# Patient Record
Sex: MALE | Race: WHITE | NOT HISPANIC OR LATINO | URBAN - METROPOLITAN AREA
[De-identification: names, ages, dates, MRNs, and addresses within clinical notes are randomized per-mention and may not be internally consistent; named-entity substitution may affect disease eponyms.]

---

## 2021-05-30 ENCOUNTER — RECORDS - HEALTHEAST (OUTPATIENT)
Dept: ADMINISTRATIVE | Facility: CLINIC | Age: 36
End: 2021-05-30

## 2021-06-14 ENCOUNTER — HOSPITAL ENCOUNTER (EMERGENCY)
Dept: EMERGENCY MEDICINE | Facility: CLINIC | Age: 36
Discharge: HOME | End: 2021-06-14
Attending: STUDENT IN AN ORGANIZED HEALTH CARE EDUCATION/TRAINING PROGRAM

## 2021-06-14 DIAGNOSIS — R10.9 FLANK PAIN: ICD-10-CM

## 2021-06-14 LAB
ALBUMIN UR-MCNC: NEGATIVE G/DL
APPEARANCE UR: CLEAR
BACTERIA #/AREA URNS HPF: ABNORMAL /[HPF]
BILIRUB UR QL STRIP: NEGATIVE
COLOR UR AUTO: COLORLESS
GLUCOSE UR STRIP-MCNC: NEGATIVE MG/DL
HGB UR QL STRIP: ABNORMAL
KETONES UR STRIP-MCNC: NEGATIVE MG/DL
LEUKOCYTE ESTERASE UR QL STRIP: NEGATIVE
NITRATE UR QL: NEGATIVE
PH UR STRIP: 6 [PH] (ref 5–8)
RBC URINE: 10 HPF
SP GR UR STRIP: 1.01 (ref 1–1.03)
SQUAMOUS EPITHELIAL: 0 /HPF
UROBILINOGEN UR STRIP-ACNC: ABNORMAL
WBC URINE: <1 HPF

## 2021-06-25 NOTE — ED TRIAGE NOTES
Patient had sudden onset left flank and LLQ pain last night, now resolved. Had nausea when pain was severe and was diaphoretic, Pain resolved a couple hours ago when going to urgent care.

## 2021-06-26 ENCOUNTER — HEALTH MAINTENANCE LETTER (OUTPATIENT)
Age: 36
End: 2021-06-26

## 2021-06-26 NOTE — ED PROVIDER NOTES
EMERGENCY DEPARTMENT ENCOUNTER      NAME: Jeffrey Tanner  AGE: 35 y.o. male  YOB: 1985  MRN: [id number]  EVALUATION DATE & TIME: 2021  9:51 AM    PCP: Provider, No Primary Care    ED PROVIDER: Carline Ascencio M.D.    Chief Complaint   Patient presents with     Flank Pain       FINAL IMPRESSION:  1. Flank pain        ED COURSE & MEDICAL DECISION MAKIN y.o. old male who presents to the ED for evaluation of flank pain.   History and physical examination as documented. Vital signs reassuring.     Patient's symptoms have resolved since getting to the ED. Feels great and has no pain now. He had severe left sided flank pain. UA with findings of small amount of blood. No pyuria or signs to suggest UTI. Has no CVA or abdominal tenderness on exam. No indication for imaging. I think it's most likely that he had ureterolithiasis and passed the stone already. Given return precautions and discharged in stable condition.     Scribe time stamps  10:35 AM I saw the patient wearing an eye shield, surgical mask, and gloves.    12:27 PM I checked in and updated patient on work up results and plan for discharge. Agreeable with plan for discharge.     0 minutes of critical care time     MEDICATIONS GIVEN IN THE EMERGENCY:  Medications - No data to display    NEW PRESCRIPTIONS STARTED AT TODAY'S ER VISIT  There are no discharge medications for this patient.       =================================================================    HPI    Patient information was obtained from: Patient     Use of : N/A     Jeffrey Tanner is a 35 y.o. male with no pertinent history who presents to this ED via walk-in for evaluation of flank pain.     Patient reports yesterday evening he developed a sudden onset of left flank pain and left lower quadrant pain. Notes his flank pain wraps around to his abdomen. At this time he also endorsed diaphoresis and nausea. This morning his symptoms persisted and he presented to Urgent  Care. When arriving to Urgent Care, patients pain fully resolved. With concern for kidney stone he was recommended to present to the ED. Denies history of kidney stones. No past surgeries. Patient denies fever, chills, cough, chest pain, shortness of breath, constipation, nausea, vomiting, urinary symptoms and any other complaints at this time.     REVIEW OF SYSTEMS   Review of Systems   Constitutional: Negative for chills and fever.   Respiratory: Negative for cough and shortness of breath.    Cardiovascular: Negative for chest pain.   Gastrointestinal: Positive for abdominal pain. Negative for constipation, nausea and vomiting.   Genitourinary: Positive for flank pain. Negative for decreased urine volume, dysuria and urgency.   All other systems reviewed and are negative.       PAST MEDICAL HISTORY:  History reviewed. No pertinent past medical history.    PAST SURGICAL HISTORY:  History reviewed. No pertinent surgical history.    CURRENT MEDICATIONS:    No current facility-administered medications on file prior to encounter.      No current outpatient medications on file prior to encounter.       ALLERGIES:  Allergies   Allergen Reactions     Pollens Extract        FAMILY HISTORY:  History reviewed. No pertinent family history.    SOCIAL HISTORY:   Social History     Socioeconomic History     Marital status:      Spouse name: None     Number of children: None     Years of education: None     Highest education level: None   Occupational History     None   Social Needs     Financial resource strain: None     Food insecurity     Worry: None     Inability: None     Transportation needs     Medical: None     Non-medical: None   Tobacco Use     Smoking status: None   Substance and Sexual Activity     Alcohol use: None     Drug use: None     Sexual activity: None   Lifestyle     Physical activity     Days per week: None     Minutes per session: None     Stress: None   Relationships     Social connections     Talks  on phone: None     Gets together: None     Attends Rastafari service: None     Active member of club or organization: None     Attends meetings of clubs or organizations: None     Relationship status: None     Intimate partner violence     Fear of current or ex partner: None     Emotionally abused: None     Physically abused: None     Forced sexual activity: None   Other Topics Concern     None   Social History Narrative     None       VITALS:  Patient Vitals for the past 24 hrs:   BP Temp Temp src Pulse Resp SpO2 Weight   06/14/21 1033 -- -- -- (!) 42 -- 100 % --   06/14/21 1030 125/70 -- -- (!) 39 -- 97 % --   06/14/21 1000 129/77 -- -- (!) 43 -- 100 % --   06/14/21 0954 145/83 -- -- 60 -- 99 % --   06/14/21 0951 145/83 97.7  F (36.5  C) Oral 69 18 99 % 204 lb (92.5 kg)       PHYSICAL EXAM    General: No acute distress.  HEENT: No gross facial asymmetry. No external evidence of trauma on visual inspection.  Neck: Moving freely.  Chest/Pulm: No respiratory distress. Breathing comfortably on room air.  CV: Regular rate. Extremities are warm and well perfused.  Abdomen: Soft and non-distended without tenderness to palpation.  MSK/Extremities: Moving all 4 extremities appropriately, no deformity. No CVA tenderness.   Skin: No visible lacerations or abrasions.  Neuro: Alert, conversant, appropriate. CN II-XII grossly intact.  Psych: Behavior normal.      LAB:  All pertinent labs reviewed and interpreted.  Results for orders placed or performed during the hospital encounter of 06/14/21   Urinalysis-UC if Indicated   Result Value Ref Range    Color, UA Colorless Light Yellow, Yellow    Clarity, UA Clear Clear    Glucose, UA Negative Negative    Protein, UA Negative Negative    Bilirubin, UA Negative Negative    Urobilinogen, UA <2.0 mg/dL <2.0 mg/dL    pH, UA 6.0 5.0 - 8.0    Blood, UA 0.06 mg/dL (!) Negative    Ketones, UA Negative Negative    Nitrite, UA Negative Negative    Leukocytes, UA Negative Negative    Specific  Gravity, UA 1.009 1.001 - 1.030    RBC, UA 10 (H) <=2 hpf    WBC UA <1 <=5 hpf    Bacteria, UA None Seen None Seen    Squamous Epithel, UA 0 <=5 /HPF           I, Adryan Young , am serving as a scribe to document services personally performed by Dr. Ascencio based on my observation and the provider's statements to me. I, Carline Ascencio MD attest that Adryan Young  is acting in a scribe capacity, has observed my performance of the services and has documented them in accordance with my direction.    Carline Ascencio M.D.  Emergency Medicine  [facility] EMERGENCY ROOM  [address] [facility] [facility] [address]  Dept: [phone]  Loc: [phone]     Carline Ascencio MD  06/14/21 3354

## 2021-07-06 VITALS — WEIGHT: 204 LBS

## 2021-10-16 ENCOUNTER — HEALTH MAINTENANCE LETTER (OUTPATIENT)
Age: 36
End: 2021-10-16

## 2022-07-23 ENCOUNTER — HEALTH MAINTENANCE LETTER (OUTPATIENT)
Age: 37
End: 2022-07-23

## 2022-09-25 ENCOUNTER — HEALTH MAINTENANCE LETTER (OUTPATIENT)
Age: 37
End: 2022-09-25

## 2023-08-06 ENCOUNTER — HEALTH MAINTENANCE LETTER (OUTPATIENT)
Age: 38
End: 2023-08-06